# Patient Record
Sex: MALE | Race: BLACK OR AFRICAN AMERICAN | NOT HISPANIC OR LATINO
[De-identification: names, ages, dates, MRNs, and addresses within clinical notes are randomized per-mention and may not be internally consistent; named-entity substitution may affect disease eponyms.]

---

## 2021-01-12 ENCOUNTER — TRANSCRIPTION ENCOUNTER (OUTPATIENT)
Age: 46
End: 2021-01-12

## 2021-01-12 ENCOUNTER — APPOINTMENT (OUTPATIENT)
Dept: FAMILY MEDICINE | Facility: CLINIC | Age: 46
End: 2021-01-12
Payer: COMMERCIAL

## 2021-01-12 VITALS
OXYGEN SATURATION: 99 % | SYSTOLIC BLOOD PRESSURE: 119 MMHG | BODY MASS INDEX: 24.52 KG/M2 | HEIGHT: 73 IN | HEART RATE: 62 BPM | WEIGHT: 185 LBS | TEMPERATURE: 98.3 F | DIASTOLIC BLOOD PRESSURE: 76 MMHG

## 2021-01-12 DIAGNOSIS — D56.3 THALASSEMIA MINOR: ICD-10-CM

## 2021-01-12 PROCEDURE — 99203 OFFICE O/P NEW LOW 30 MIN: CPT

## 2021-01-12 PROCEDURE — 99072 ADDL SUPL MATRL&STAF TM PHE: CPT

## 2021-01-12 NOTE — HISTORY OF PRESENT ILLNESS
[FreeTextEntry8] : cc-- LLQ pain \par \par 44 yo M with hx kidney stone presents for LLQ pain-- severe pain x 1 day. Thought was gas-- had coffee, had small but incomplete BM. BM was normal. Had hot shower and tylenol-- wasn't sure which it was but improved. Was tender to touch throughout the night but has much improved. Was nauseous. Normally has 1 BM daily. No bloating. Had soft stool the next morning, but was normal brown in color. Not diarrhea. \par \par Had hematuria a year ago, otherwise, no urinary issues. Was first dx with renal stones in 2013. \par \par NIght sweats since Sept. Some nights more than others. Even with window open. Some weight loss-- but not sure if because eating habits have changed and are irregular. \par \par smokes half pack cigarettes x 22 years.\par \par mom-- breast ca, lymphoma-- not sure which was primary. maternal father had breast ca. \par dad-- unsure which cancer. paternal father had prostate cancer.

## 2021-01-12 NOTE — REVIEW OF SYSTEMS
[Fever] : no fever [Night Sweats] : night sweats [Recent Change In Weight] : ~T recent weight change [Abdominal Pain] : abdominal pain [Nausea] : nausea [Constipation] : no constipation [Diarrhea] : no diarrhea [Vomiting] : no vomiting [Heartburn] : no heartburn [Dysuria] : no dysuria [Hematuria] : no hematuria [Negative] : Heme/Lymph

## 2021-01-12 NOTE — PHYSICAL EXAM
[Soft] : abdomen soft [Normal] : affect was normal and insight and judgment were intact [de-identified] : LLQ tenderness to palpation, no guarding, no rebound.

## 2021-01-13 LAB
ALBUMIN SERPL ELPH-MCNC: 4.8 G/DL
ALP BLD-CCNC: 72 U/L
ALT SERPL-CCNC: 10 U/L
ANION GAP SERPL CALC-SCNC: 8 MMOL/L
AST SERPL-CCNC: 13 U/L
BASOPHILS # BLD AUTO: 0.02 K/UL
BASOPHILS NFR BLD AUTO: 0.4 %
BILIRUB SERPL-MCNC: 0.4 MG/DL
BUN SERPL-MCNC: 12 MG/DL
CALCIUM SERPL-MCNC: 9.9 MG/DL
CHLORIDE SERPL-SCNC: 104 MMOL/L
CO2 SERPL-SCNC: 28 MMOL/L
CREAT SERPL-MCNC: 1.39 MG/DL
EOSINOPHIL # BLD AUTO: 0.05 K/UL
EOSINOPHIL NFR BLD AUTO: 0.9 %
ESTIMATED AVERAGE GLUCOSE: 105 MG/DL
GLUCOSE SERPL-MCNC: 98 MG/DL
HBA1C MFR BLD HPLC: 5.3 %
HCT VFR BLD CALC: 44 %
HGB BLD-MCNC: 13.8 G/DL
HIV1+2 AB SPEC QL IA.RAPID: NONREACTIVE
IMM GRANULOCYTES NFR BLD AUTO: 0.2 %
LYMPHOCYTES # BLD AUTO: 2.12 K/UL
LYMPHOCYTES NFR BLD AUTO: 39.6 %
MAN DIFF?: NORMAL
MCHC RBC-ENTMCNC: 28.1 PG
MCHC RBC-ENTMCNC: 31.4 GM/DL
MCV RBC AUTO: 89.6 FL
MONOCYTES # BLD AUTO: 0.41 K/UL
MONOCYTES NFR BLD AUTO: 7.6 %
NEUTROPHILS # BLD AUTO: 2.75 K/UL
NEUTROPHILS NFR BLD AUTO: 51.3 %
PLATELET # BLD AUTO: 266 K/UL
POTASSIUM SERPL-SCNC: 5 MMOL/L
PROT SERPL-MCNC: 7.2 G/DL
RBC # BLD: 4.91 M/UL
RBC # FLD: 14.1 %
SODIUM SERPL-SCNC: 140 MMOL/L
TSH SERPL-ACNC: 0.32 UIU/ML
WBC # FLD AUTO: 5.36 K/UL

## 2021-01-21 ENCOUNTER — APPOINTMENT (OUTPATIENT)
Dept: CT IMAGING | Facility: IMAGING CENTER | Age: 46
End: 2021-01-21
Payer: COMMERCIAL

## 2021-01-21 ENCOUNTER — OUTPATIENT (OUTPATIENT)
Dept: OUTPATIENT SERVICES | Facility: HOSPITAL | Age: 46
LOS: 1 days | End: 2021-01-21
Payer: COMMERCIAL

## 2021-01-21 DIAGNOSIS — R10.32 LEFT LOWER QUADRANT PAIN: ICD-10-CM

## 2021-01-21 PROCEDURE — 82565 ASSAY OF CREATININE: CPT

## 2021-01-21 PROCEDURE — 74177 CT ABD & PELVIS W/CONTRAST: CPT

## 2021-01-21 PROCEDURE — 74177 CT ABD & PELVIS W/CONTRAST: CPT | Mod: 26

## 2021-02-11 ENCOUNTER — APPOINTMENT (OUTPATIENT)
Dept: UROLOGY | Facility: CLINIC | Age: 46
End: 2021-02-11
Payer: COMMERCIAL

## 2021-02-11 VITALS
SYSTOLIC BLOOD PRESSURE: 124 MMHG | HEART RATE: 57 BPM | WEIGHT: 187 LBS | RESPIRATION RATE: 17 BRPM | DIASTOLIC BLOOD PRESSURE: 78 MMHG | BODY MASS INDEX: 24.67 KG/M2 | TEMPERATURE: 98.1 F

## 2021-02-11 DIAGNOSIS — Z00.00 ENCOUNTER FOR GENERAL ADULT MEDICAL EXAMINATION W/OUT ABNORMAL FINDINGS: ICD-10-CM

## 2021-02-11 DIAGNOSIS — R61 GENERALIZED HYPERHIDROSIS: ICD-10-CM

## 2021-02-11 DIAGNOSIS — N20.0 CALCULUS OF KIDNEY: ICD-10-CM

## 2021-02-11 PROCEDURE — 99072 ADDL SUPL MATRL&STAF TM PHE: CPT

## 2021-02-11 PROCEDURE — 99204 OFFICE O/P NEW MOD 45 MIN: CPT

## 2021-02-11 NOTE — LETTER BODY
[FreeTextEntry1] : Beth Lee MD\par [address],\[place] [address]\par [phone]\par \par Dear ,\par \par Reason for Visit: Bilateral renal stones. LLQ discomfort.\par \par This is a 45 year-old working gentleman with bilateral renal stones. Patient is referred for evaluation of his condition. The patient recently underwent CT scan, which demonstrated multiple bilateral nonobstructing renal stones. He complains of LLQ discomfort. The patient also reports of night sweats. Patient denies any gross hematuria or dysuria or urinary incontinence. The patient denies any aggravating or relieving factors. The patient denies any interference of function. The patient is entirely asymptomatic. All other review of systems are negative. He has lymphoma in his family medical history through his mother. He has no previous surgical history. Past medical history, family history and social history were inquired and were noncontributory to current condition. The patient does not use tobacco or drink alcohol. Medications and allergies were reviewed. He has no known allergies to medication. \par \par On examination, the patient is a healthy-appearing gentleman in no acute distress. He is alert and oriented follows commands. He has normal mood and affect. He is normocephalic. Oral no thrush. Neck is supple. Respirations are unlabored. His abdomen is soft and nontender. Liver is nonpalpable. Bladder is nonpalpable. No CVA tenderness. Neurologically he is grossly intact. No peripheral edema. Skin without gross abnormality. He has normal male external genitalia. Normal meatus. Bilateral testes are descended intrascrotally and normal to palpation. On rectal examination, there is normal sphincter tone. The prostate is clinically benign without focal induration or nodularity.\par \par I personally reviewed CT scan images with the patient today and images demonstrated multiple bilateral nonobstructing renal calculi, measuring up to 4 mm bilaterally.\par \par Assessment: Small bilateral renal stones. LLQ discomfort.\par \par I counseled the patient. I discussed the various etiologies of his symptoms. Given the small size of his nonobstructing bilateral renal stones, I recommended the patient continue with conservative management of his symptoms. I also recommended he obtain 24-Hour urinalysis for kidney stone risk assessment panel to evaluate his stone burden. Risks and alternatives were discussed. I answered the patient questions. The patient will follow-up as directed and will contact me with any questions or concerns. Thank you for the opportunity to participate in the care of Mr. ROSS. I will keep you updated on his progress.\par \par Plan: Conservative management. 24-Hour urinalysis. Kidney stone risk assessment panel. Follow-up as directed.

## 2021-02-11 NOTE — ADDENDUM
[FreeTextEntry1] : Entered by Dustin Dalton, acting as scribe for Dr. Kushal Ambrosio.\par \par The documentation recorded by the scribe accurately reflects the service I personally performed and the decisions made by me.\par

## 2021-03-27 ENCOUNTER — EMERGENCY (EMERGENCY)
Facility: HOSPITAL | Age: 46
LOS: 1 days | Discharge: HOME | End: 2021-03-27
Attending: EMERGENCY MEDICINE | Admitting: EMERGENCY MEDICINE
Payer: MEDICAID

## 2021-03-27 VITALS
DIASTOLIC BLOOD PRESSURE: 83 MMHG | OXYGEN SATURATION: 98 % | HEART RATE: 94 BPM | WEIGHT: 184.97 LBS | TEMPERATURE: 99 F | RESPIRATION RATE: 16 BRPM | SYSTOLIC BLOOD PRESSURE: 111 MMHG | HEIGHT: 73 IN

## 2021-03-27 PROCEDURE — 99284 EMERGENCY DEPT VISIT MOD MDM: CPT

## 2021-03-27 PROCEDURE — 71045 X-RAY EXAM CHEST 1 VIEW: CPT | Mod: 26

## 2021-03-27 NOTE — ED PROVIDER NOTE - PHYSICAL EXAMINATION
Physical Exam:    I have reviewed the triage vital signs.    Gen: NAD, AOx3, non-toxic appearing, able to ambulate without assistance  Head and Neck: NCAT, Neck supple without meningismus   HEENT: EOMI, PEERLA, normal conjunctiva, tongue midline, oral mucosa moist  Lung: CTAB, no respiratory distress, no wheezes/rhonchi/rales B/L, speaking in full sentences  CV: RRR, no murmurs, rubs or gallops  Abd: soft, NT, ND, no guarding, no rigidity, no rebound tenderness, no CVA tenderness, no masses.   MSK: no gross deformities, ROM normal in UE/LE, no back tenderness. left rib 8 swollen, asymetic, pain to palpation, crepitus.   Neuro: CNs II-XII grossly intact. No focal sensory or motor deficits  Skin: Warm, well perfused, no rash, no leg swelling  Psych: Appropriate mood and affect

## 2021-03-27 NOTE — ED PROVIDER NOTE - ATTENDING CONTRIBUTION TO CARE
I have seen and examined the patient on the patient´s visit date. I have reviewed the note written by Kushal Meade DO  on that visit day. I have supervised and participated as necessary in the performance of procedures indicated for patient management and was available at all phases of the patient´s visit when needed. We discussed the history, physical exam findings, management plan, and  medical decision making. I have made my additions, exceptions, and revisions within the chart and I agree with H and P as documented in its entirety. The data and my interpretation of any data collected from labs, interventions and imaging appear below as well as my independent medical decision making and considerations    The patient is a 46y Male who has no significant past surgical history PTED with   Vital Signs Last 24 Hrs  T(F): 98.7 HR: 94 BP: 111/83 RR: 16 SpO2: 98% (27 Mar 2021 21:23)  PE: as described; my additions and exceptions are noted in the chart  CXR IMPRESSION: Clear lungs. No acute displaced fracture.    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

## 2021-03-27 NOTE — ED PROVIDER NOTE - PATIENT PORTAL LINK FT
You can access the FollowMyHealth Patient Portal offered by [facility] by registering at the following website: [email or web address]. By joining [facility]’s FollowMyHealth portal, you will also be able to view your health information using other applications (apps) compatible with our system.

## 2021-03-27 NOTE — ED ADULT TRIAGE NOTE - CHIEF COMPLAINT QUOTE
Pt reports he fell out of bed in the AM and hit his left side ribs against the edge of the bed.  Pt reports "clicking" sound when moving.  Left side of rib cage (site of injury) appears different from right side.  Pt denies blood thinners.  Pt denies difficulty breathing but reports pain during deep inspiration.  No bruising observed to area.  Pt does not appear in acute distress.

## 2021-03-27 NOTE — ED PROVIDER NOTE - OBJECTIVE STATEMENT
46 m complaining of rib pain. staretd this monring. pt was intoxcicated last night and believes he injured it while having sexual intercourse with his friend. pt took advil for pain which didn't help. rib pain left side only, hurts worse with moving.

## 2021-03-27 NOTE — ED PROVIDER NOTE - NSFOLLOWUPINSTRUCTIONS_ED_ALL_ED_FT
You were seen for rib subluxation.     Please take 600 mg of Ibuprofen (aka Motrin, Advil) and/or 650 mg Acetaminophen (aka Tylenol) every 6 hours, as needed, for mild-moderate pain.      Seek immediate medical assistance for any new or worsening symptoms.     Follow up with your PCP.

## 2021-03-27 NOTE — ED ADULT TRIAGE NOTE - CCCP TRG CHIEF CMPLNT
Problem: Patient Care Overview  Goal: Plan of Care Review  Outcome: Ongoing (interventions implemented as appropriate)   10/10/19 2019   Coping/Psychosocial   Plan of Care Reviewed With patient   Plan of Care Review   Progress improving   OTHER   Outcome Summary L reverse total shoulder today, arrived to floor 1250, A&O, drowsy upon arrival, 4L, dressing c/d/i, scope patch behind right ear, VSS, educated on bp monitoring, plans home at d/c          rib pain/injury

## 2021-03-30 ENCOUNTER — APPOINTMENT (OUTPATIENT)
Dept: GASTROENTEROLOGY | Facility: CLINIC | Age: 46
End: 2021-03-30

## 2021-04-12 PROBLEM — Z78.9 OTHER SPECIFIED HEALTH STATUS: Chronic | Status: ACTIVE | Noted: 2021-03-27

## 2021-04-13 ENCOUNTER — APPOINTMENT (OUTPATIENT)
Dept: GASTROENTEROLOGY | Facility: CLINIC | Age: 46
End: 2021-04-13
Payer: COMMERCIAL

## 2021-04-13 VITALS
SYSTOLIC BLOOD PRESSURE: 117 MMHG | HEART RATE: 80 BPM | HEIGHT: 73 IN | TEMPERATURE: 96.8 F | BODY MASS INDEX: 24.52 KG/M2 | WEIGHT: 185 LBS | DIASTOLIC BLOOD PRESSURE: 72 MMHG

## 2021-04-13 PROCEDURE — 99072 ADDL SUPL MATRL&STAF TM PHE: CPT

## 2021-04-13 PROCEDURE — 99204 OFFICE O/P NEW MOD 45 MIN: CPT

## 2021-04-13 PROCEDURE — 82274 ASSAY TEST FOR BLOOD FECAL: CPT | Mod: QW

## 2021-04-14 NOTE — HISTORY OF PRESENT ILLNESS
[Heartburn] : denies heartburn [Nausea] : denies nausea [Vomiting] : denies vomiting [Diarrhea] : denies diarrhea [Constipation] : stable constipation [Yellow Skin Or Eyes (Jaundice)] : denies jaundice [Abdominal Pain] : stable abdominal pain [Abdominal Swelling] : denies abdominal swelling [_________] : Performed [unfilled] [de-identified] : James presents to the office today for evaluation of LLQ pain.\par \par He reports that over the past year, he has had intermittent cramping pain in his LLQ, which usually occurs after a BM.  For most of his life, he has elevated his legs during a BM, and he would have one large BM in the morning.  He would always have to rest after a BM.  During the pandemic, his BMs have been in the evening for unclear reasons.  He will start to have cramps and urge sometimes at work but he will hold his stool until he gets home.  The cramping pain was intermittent at first.  This past January, he had more severe pain in his LLQ that occurred daily until about mid-March when he took a laxative to help him move his bowels.  He underwent a CT A/P in January which showed a moderate amount of stool in the colon.  The patient denies any dysphagia, nausea, GI bleeding, or family history of colon cancer.  He has been eating less during the pandemic and has lost about 10-15 pounds.  The patient is also concerned about intermittent night sweats that he has been having.\par \par  [de-identified] : moderate stool, nonobstructing renal calculi

## 2021-04-14 NOTE — PHYSICAL EXAM
[General Appearance - Alert] : alert [General Appearance - In No Acute Distress] : in no acute distress [Sclera] : the sclera and conjunctiva were normal [Extraocular Movements] : extraocular movements were intact [Outer Ear] : the ears and nose were normal in appearance [Hearing Threshold Finger Rub Not [place]] : hearing was normal [Neck Appearance] : the appearance of the neck was normal [Neck Cervical Mass (___cm)] : no neck mass was observed [Auscultation Breath Sounds / Voice Sounds] : lungs were clear to auscultation bilaterally [Heart Rate And Rhythm] : heart rate was normal and rhythm regular [Heart Sounds] : normal S1 and S2 [Edema] : there was no peripheral edema [Bowel Sounds] : normal bowel sounds [Abdomen Tenderness] : non-tender [Abdomen Soft] : soft [] : no hepato-splenomegaly [Abdomen Mass (___ Cm)] : no abdominal mass palpated [Abdomen Hernia] : no hernia was discovered [Normal Sphincter Tone] : normal sphincter tone [No Rectal Mass] : no rectal mass [Internal Hemorrhoid] : internal hemorrhoids [Occult Blood Positive] : stool was negative for occult blood [FreeTextEntry1] : FIT negative [Prostate Enlargement] : the prostate was not enlarged [Cervical Lymph Nodes Enlarged Anterior Bilaterally] : anterior cervical [Supraclavicular Lymph Nodes Enlarged Bilaterally] : supraclavicular [No CVA Tenderness] : no ~M costovertebral angle tenderness [Musculoskeletal - Swelling] : no joint swelling seen [Abnormal Walk] : normal gait [Skin Color & Pigmentation] : normal skin color and pigmentation [Skin Turgor] : normal skin turgor [No Focal Deficits] : no focal deficits [Oriented To Time, Place, And Person] : oriented to person, place, and time [Impaired Insight] : insight and judgment were intact

## 2021-04-14 NOTE — ASSESSMENT
[FreeTextEntry1] : 1.  LLQ pain after BM.  Improved after laxatives.  CT A/P in January 2021 with moderate stool, trace free fluid in pelvis.  Differential includes colonic spasm, diverticular disease, colonic inflammation, neoplasm, musculoskeletal pain.\par 2.  History of kidney stones.\par 3.  Night sweats.\par \par Recs:\par - Recent labs and CT images reviewed.\par - The patient was advised to increase fluid and dietary fiber intake.  In addition, the patient was advised to use a fiber supplement daily and Miralax as needed for severe constipation.\par - Patient was advised to undergo colonoscopy- procedure, risks, benefits, and alternatives were explained.  Brochure given. PEG prep.  Patient is undecided upon the procedure and will speak with his wife first.

## 2021-04-21 RX ORDER — POLYETHYLENE GLYCOL-3350, SODIUM CHLORIDE, POTASSIUM CHLORIDE AND SODIUM BICARBONATE 420; 11.2; 5.72; 1.48 G/438.4G; G/438.4G; G/438.4G; G/438.4G
420 POWDER, FOR SOLUTION ORAL
Qty: 1 | Refills: 0 | Status: DISCONTINUED | COMMUNITY
Start: 2021-04-13 | End: 2021-04-21

## 2021-05-11 ENCOUNTER — APPOINTMENT (OUTPATIENT)
Dept: GASTROENTEROLOGY | Facility: CLINIC | Age: 46
End: 2021-05-11

## 2022-03-16 ENCOUNTER — NON-APPOINTMENT (OUTPATIENT)
Age: 47
End: 2022-03-16

## 2022-03-30 ENCOUNTER — APPOINTMENT (OUTPATIENT)
Dept: PULMONOLOGY | Facility: CLINIC | Age: 47
End: 2022-03-30
Payer: COMMERCIAL

## 2022-03-30 VITALS
HEIGHT: 73 IN | DIASTOLIC BLOOD PRESSURE: 90 MMHG | TEMPERATURE: 97.7 F | RESPIRATION RATE: 12 BRPM | HEART RATE: 69 BPM | BODY MASS INDEX: 27.3 KG/M2 | WEIGHT: 206 LBS | SYSTOLIC BLOOD PRESSURE: 133 MMHG | OXYGEN SATURATION: 95 %

## 2022-03-30 DIAGNOSIS — Z80.7 FAMILY HISTORY OF OTHER MALIGNANT NEOPLASMS OF LYMPHOID, HEMATOPOIETIC AND RELATED TISSUES: ICD-10-CM

## 2022-03-30 DIAGNOSIS — Z72.0 TOBACCO USE: ICD-10-CM

## 2022-03-30 DIAGNOSIS — Z23 ENCOUNTER FOR IMMUNIZATION: ICD-10-CM

## 2022-03-30 DIAGNOSIS — Z80.42 FAMILY HISTORY OF MALIGNANT NEOPLASM OF PROSTATE: ICD-10-CM

## 2022-03-30 DIAGNOSIS — Z87.442 PERSONAL HISTORY OF URINARY CALCULI: ICD-10-CM

## 2022-03-30 DIAGNOSIS — R53.83 OTHER FATIGUE: ICD-10-CM

## 2022-03-30 PROCEDURE — 99203 OFFICE O/P NEW LOW 30 MIN: CPT

## 2022-03-30 NOTE — HISTORY OF PRESENT ILLNESS
[de-identified] : Patient is a 47 year old male with history of kidney stones presents complaints of left side abdominal pain \par \par Patient reports having this abdominal pain over the last year which is getting increasingly worse over the last 4-5 months. Patient reports he was worked up by GI last year by GI and was told he a slow responsive colon from holding his bowel movements in. He reports that since he was young he states he only uses the bathroom at home. He had a CT of the abdomen done which was unremarkable. Currently he reports that after having bowel movements he's having increased LLQ pain and discomfort and reports feeling more fatigue lately. Denies any dark/ tarry stools, blood or mucus\par \par Denies any chest pain, SOB, HA, N/V\par \par Patient is a 1/2 pack/day smoker

## 2022-03-30 NOTE — PLAN
[FreeTextEntry1] : Establish care \par \par LLQ pain/discomfort\par Increase fiber intake \par Increase fluid intake \par GI referral\par \par Fatigue \par Will check anemia \par \par Current smoker \par Smoking cessation discussed \par \par Labs ordered pt to follow-up in 1 week for results

## 2022-03-30 NOTE — HEALTH RISK ASSESSMENT
[Current] : Current [Yes] : Yes [de-identified] : 1/2 pack/d [de-identified] : Social  [ColonoscopyDate] : Never

## 2022-03-30 NOTE — ASSESSMENT
[FreeTextEntry1] : In summary the patient is a 47-year-old male with past medical history significant for nephrocalcinosis who comes in complaining of constipation.  Patient's physical exam is within normal limits.  I had a lengthy conversation with the patient regarding possible causes.  I have instructed him to increase his fiber intake as well as his water intake.  I have also encouraged him to follow-up with his gastroenterologist and undergo a colonoscopy.  The patient should also follow-up with his PCP

## 2022-06-14 ENCOUNTER — APPOINTMENT (OUTPATIENT)
Dept: GASTROENTEROLOGY | Facility: CLINIC | Age: 47
End: 2022-06-14
Payer: COMMERCIAL

## 2022-06-14 VITALS
BODY MASS INDEX: 27.17 KG/M2 | WEIGHT: 205 LBS | HEART RATE: 69 BPM | SYSTOLIC BLOOD PRESSURE: 130 MMHG | DIASTOLIC BLOOD PRESSURE: 90 MMHG | HEIGHT: 73 IN

## 2022-06-14 DIAGNOSIS — R10.32 LEFT LOWER QUADRANT PAIN: ICD-10-CM

## 2022-06-14 DIAGNOSIS — Z12.11 ENCOUNTER FOR SCREENING FOR MALIGNANT NEOPLASM OF COLON: ICD-10-CM

## 2022-06-14 PROCEDURE — 99214 OFFICE O/P EST MOD 30 MIN: CPT

## 2022-06-14 RX ORDER — POLYETHYLENE GLYCOL-3350 AND ELECTROLYTES 236; 6.74; 5.86; 2.97; 22.74 G/274.31G; G/274.31G; G/274.31G; G/274.31G; G/274.31G
236 POWDER, FOR SOLUTION ORAL
Qty: 1 | Refills: 0 | Status: DISCONTINUED | COMMUNITY
Start: 2021-04-21 | End: 2022-06-14

## 2022-06-14 NOTE — HISTORY OF PRESENT ILLNESS
[Heartburn] : denies heartburn [Nausea] : denies nausea [Vomiting] : denies vomiting [Diarrhea] : denies diarrhea [Yellow Skin Or Eyes (Jaundice)] : denies jaundice [Abdominal Swelling] : denies abdominal swelling [_________] : Performed [unfilled] [Constipation] : improvement in constipation [Abdominal Pain] : improved abdominal pain [de-identified] : James presents with his wife for follow up of LLQ pain.  He was initially seen in the office in April 2021.\par \par The patient was initially seen in the office in 2021 for sharp, LLQ pain and weight loss.  Since his last visit, he has been eating more and his weight has increased and is currently stable.  For the past 4-6 months, he is having 1-2 BMs a day.  He continues to hold his bowels if he is outside the house and is only comfortable using his own bathroom.  He still experiences LLQ after moving his bowels, but it occurs less frequently, maybe every 3-4 days.  When the pain occurs, it lasts for 2-3 hours.  Sometimes, taking a warm shower helps ease the pain.  The pain radiates towards his suprapubic region.  He is not sure if straining is causing the pain.  He denies any urinary symptoms or back pain.\par \par From initial visit:\par Since the COVID-19 pandemic began, the patient has had intermittent cramping pain in his LLQ, which usually occurs after a BM.  For most of his life, he has elevated his legs during a BM, and he would have one large BM in the morning.  He would always have to rest after a BM.  During the pandemic, his BMs have been in the evening for unclear reasons.  He underwent a CT A/P in January 2021 which showed a moderate amount of stool in the colon.  The patient denies any family history of colon cancer. [de-identified] : moderate stool, nonobstructing renal calculi

## 2022-06-14 NOTE — ASSESSMENT
[FreeTextEntry1] : 1.  Encounter for colon cancer screening in average risk patient.  No prior colonoscopy.\par 2.  LLQ pain after BM, improved.  CT A/P in January 2021 with moderate stool, trace free fluid in pelvis.  Differential includes colonic spasm, diverticular disease, colonic inflammation, neoplasm, musculoskeletal pain.\par 3.  History of kidney stones.\par 4.  Night sweats.\par \par Recs:\par - Prior labs and CT images reviewed.\par - The patient was again advised to consider starting a fiber supplement to minimize constipation.\par - Patient was advised to undergo colonoscopy- procedure, risks, benefits, and alternatives were explained. Patient agreeable. Brochure given. PEG prep.\par - If colonoscopy unremarkable, may prescribe antispasmodic.

## 2022-06-14 NOTE — PHYSICAL EXAM
[General Appearance - Alert] : alert [General Appearance - In No Acute Distress] : in no acute distress [Sclera] : the sclera and conjunctiva were normal [Extraocular Movements] : extraocular movements were intact [Outer Ear] : the ears and nose were normal in appearance [Hearing Threshold Finger Rub Not [place]] : hearing was normal [Neck Appearance] : the appearance of the neck was normal [Neck Cervical Mass (___cm)] : no neck mass was observed [Auscultation Breath Sounds / Voice Sounds] : lungs were clear to auscultation bilaterally [Heart Sounds] : normal S1 and S2 [Heart Rate And Rhythm] : heart rate was normal and rhythm regular [Edema] : there was no peripheral edema [Bowel Sounds] : normal bowel sounds [Abdomen Soft] : soft [] : no hepato-splenomegaly [Abdomen Mass (___ Cm)] : no abdominal mass palpated [Abdomen Hernia] : no hernia was discovered [Cervical Lymph Nodes Enlarged Anterior Bilaterally] : anterior cervical [No CVA Tenderness] : no ~M costovertebral angle tenderness [Supraclavicular Lymph Nodes Enlarged Bilaterally] : supraclavicular [Abnormal Walk] : normal gait [Musculoskeletal - Swelling] : no joint swelling seen [Skin Color & Pigmentation] : normal skin color and pigmentation [Skin Turgor] : normal skin turgor [No Focal Deficits] : no focal deficits [Oriented To Time, Place, And Person] : oriented to person, place, and time [Impaired Insight] : insight and judgment were intact [FreeTextEntry1] : mild LLQ pain

## 2022-06-16 DIAGNOSIS — Z01.818 ENCOUNTER FOR OTHER PREPROCEDURAL EXAMINATION: ICD-10-CM

## 2022-06-20 RX ORDER — POLYETHYLENE GLYCOL 3350 AND ELECTROLYTES WITH LEMON FLAVOR 236; 22.74; 6.74; 5.86; 2.97 G/4L; G/4L; G/4L; G/4L; G/4L
236 POWDER, FOR SOLUTION ORAL
Qty: 1 | Refills: 0 | Status: ACTIVE | COMMUNITY
Start: 2022-06-14 | End: 1900-01-01

## 2022-06-24 ENCOUNTER — LABORATORY RESULT (OUTPATIENT)
Age: 47
End: 2022-06-24

## 2022-06-24 ENCOUNTER — APPOINTMENT (OUTPATIENT)
Dept: GASTROENTEROLOGY | Facility: CLINIC | Age: 47
End: 2022-06-24
Payer: COMMERCIAL

## 2022-06-24 PROCEDURE — 45380 COLONOSCOPY AND BIOPSY: CPT | Mod: 33

## 2022-07-19 LAB — SARS-COV-2 N GENE NPH QL NAA+PROBE: NOT DETECTED
